# Patient Record
Sex: MALE | Race: OTHER | HISPANIC OR LATINO | URBAN - METROPOLITAN AREA
[De-identification: names, ages, dates, MRNs, and addresses within clinical notes are randomized per-mention and may not be internally consistent; named-entity substitution may affect disease eponyms.]

---

## 2017-10-07 ENCOUNTER — INPATIENT (INPATIENT)
Facility: HOSPITAL | Age: 52
LOS: 2 days | Discharge: HOME | DRG: 872 | End: 2017-10-10
Attending: INTERNAL MEDICINE | Admitting: HOSPITALIST
Payer: MEDICAID

## 2017-10-07 VITALS
HEART RATE: 106 BPM | OXYGEN SATURATION: 100 % | HEIGHT: 69 IN | DIASTOLIC BLOOD PRESSURE: 69 MMHG | SYSTOLIC BLOOD PRESSURE: 113 MMHG | TEMPERATURE: 103 F | RESPIRATION RATE: 16 BRPM | WEIGHT: 240.08 LBS

## 2017-10-07 DIAGNOSIS — Z90.49 ACQUIRED ABSENCE OF OTHER SPECIFIED PARTS OF DIGESTIVE TRACT: Chronic | ICD-10-CM

## 2017-10-07 DIAGNOSIS — A41.9 SEPSIS, UNSPECIFIED ORGANISM: ICD-10-CM

## 2017-10-07 LAB
ANION GAP SERPL CALC-SCNC: 16 MMOL/L — SIGNIFICANT CHANGE UP (ref 5–17)
APPEARANCE UR: CLEAR — SIGNIFICANT CHANGE UP
APTT BLD: 28 SEC — SIGNIFICANT CHANGE UP (ref 27.5–37.4)
BILIRUB UR-MCNC: NEGATIVE — SIGNIFICANT CHANGE UP
BUN SERPL-MCNC: 14 MG/DL — SIGNIFICANT CHANGE UP (ref 8–20)
CALCIUM SERPL-MCNC: 9.1 MG/DL — SIGNIFICANT CHANGE UP (ref 8.6–10.2)
CHLORIDE SERPL-SCNC: 97 MMOL/L — LOW (ref 98–107)
CO2 SERPL-SCNC: 26 MMOL/L — SIGNIFICANT CHANGE UP (ref 22–29)
COLOR SPEC: YELLOW — SIGNIFICANT CHANGE UP
CREAT SERPL-MCNC: 0.81 MG/DL — SIGNIFICANT CHANGE UP (ref 0.5–1.3)
DIFF PNL FLD: ABNORMAL
EPI CELLS # UR: SIGNIFICANT CHANGE UP
GLUCOSE SERPL-MCNC: 115 MG/DL — SIGNIFICANT CHANGE UP (ref 70–115)
GLUCOSE UR QL: NEGATIVE MG/DL — SIGNIFICANT CHANGE UP
HCT VFR BLD CALC: 42.8 % — SIGNIFICANT CHANGE UP (ref 42–52)
HGB BLD-MCNC: 14.8 G/DL — SIGNIFICANT CHANGE UP (ref 14–18)
INR BLD: 1.12 RATIO — SIGNIFICANT CHANGE UP (ref 0.88–1.16)
KETONES UR-MCNC: NEGATIVE — SIGNIFICANT CHANGE UP
LACTATE BLDV-MCNC: 1.7 MMOL/L — SIGNIFICANT CHANGE UP (ref 0.5–2)
LACTATE BLDV-MCNC: 2.2 MMOL/L — HIGH (ref 0.5–2)
LACTATE BLDV-MCNC: 2.3 MMOL/L — HIGH (ref 0.5–2)
LEUKOCYTE ESTERASE UR-ACNC: ABNORMAL
MCHC RBC-ENTMCNC: 30.4 PG — SIGNIFICANT CHANGE UP (ref 27–31)
MCHC RBC-ENTMCNC: 34.6 G/DL — SIGNIFICANT CHANGE UP (ref 32–36)
MCV RBC AUTO: 87.9 FL — SIGNIFICANT CHANGE UP (ref 80–94)
NITRITE UR-MCNC: NEGATIVE — SIGNIFICANT CHANGE UP
PH UR: 8 — SIGNIFICANT CHANGE UP (ref 5–8)
PLATELET # BLD AUTO: 244 K/UL — SIGNIFICANT CHANGE UP (ref 150–400)
POTASSIUM SERPL-MCNC: 3.4 MMOL/L — LOW (ref 3.5–5.3)
POTASSIUM SERPL-SCNC: 3.4 MMOL/L — LOW (ref 3.5–5.3)
PROT UR-MCNC: 15 MG/DL
PROTHROM AB SERPL-ACNC: 12.3 SEC — SIGNIFICANT CHANGE UP (ref 9.8–12.7)
RBC # BLD: 4.87 M/UL — SIGNIFICANT CHANGE UP (ref 4.6–6.2)
RBC # FLD: 13.5 % — SIGNIFICANT CHANGE UP (ref 11–15.6)
RBC CASTS # UR COMP ASSIST: ABNORMAL /HPF (ref 0–4)
SODIUM SERPL-SCNC: 139 MMOL/L — SIGNIFICANT CHANGE UP (ref 135–145)
SP GR SPEC: 1.01 — SIGNIFICANT CHANGE UP (ref 1.01–1.02)
UROBILINOGEN FLD QL: NEGATIVE MG/DL — SIGNIFICANT CHANGE UP
WBC # BLD: 16.6 K/UL — HIGH (ref 4.8–10.8)
WBC # FLD AUTO: 16.6 K/UL — HIGH (ref 4.8–10.8)
WBC UR QL: ABNORMAL

## 2017-10-07 PROCEDURE — 71010: CPT | Mod: 26

## 2017-10-07 PROCEDURE — 99223 1ST HOSP IP/OBS HIGH 75: CPT

## 2017-10-07 PROCEDURE — 99291 CRITICAL CARE FIRST HOUR: CPT

## 2017-10-07 RX ORDER — SODIUM CHLORIDE 9 MG/ML
3 INJECTION INTRAMUSCULAR; INTRAVENOUS; SUBCUTANEOUS ONCE
Qty: 0 | Refills: 0 | Status: COMPLETED | OUTPATIENT
Start: 2017-10-07 | End: 2017-10-07

## 2017-10-07 RX ORDER — CEFTRIAXONE 500 MG/1
1 INJECTION, POWDER, FOR SOLUTION INTRAMUSCULAR; INTRAVENOUS EVERY 24 HOURS
Qty: 0 | Refills: 0 | Status: DISCONTINUED | OUTPATIENT
Start: 2017-10-08 | End: 2017-10-08

## 2017-10-07 RX ORDER — ACETAMINOPHEN 500 MG
650 TABLET ORAL EVERY 6 HOURS
Qty: 0 | Refills: 0 | Status: DISCONTINUED | OUTPATIENT
Start: 2017-10-07 | End: 2017-10-10

## 2017-10-07 RX ORDER — POTASSIUM CHLORIDE 20 MEQ
40 PACKET (EA) ORAL ONCE
Qty: 0 | Refills: 0 | Status: COMPLETED | OUTPATIENT
Start: 2017-10-07 | End: 2017-10-07

## 2017-10-07 RX ORDER — INFLUENZA VIRUS VACCINE 15; 15; 15; 15 UG/.5ML; UG/.5ML; UG/.5ML; UG/.5ML
0.5 SUSPENSION INTRAMUSCULAR ONCE
Qty: 0 | Refills: 0 | Status: COMPLETED | OUTPATIENT
Start: 2017-10-07 | End: 2017-10-10

## 2017-10-07 RX ORDER — CEFTRIAXONE 500 MG/1
1 INJECTION, POWDER, FOR SOLUTION INTRAMUSCULAR; INTRAVENOUS ONCE
Qty: 0 | Refills: 0 | Status: COMPLETED | OUTPATIENT
Start: 2017-10-07 | End: 2017-10-07

## 2017-10-07 RX ORDER — SODIUM CHLORIDE 9 MG/ML
1000 INJECTION INTRAMUSCULAR; INTRAVENOUS; SUBCUTANEOUS
Qty: 0 | Refills: 0 | Status: DISCONTINUED | OUTPATIENT
Start: 2017-10-07 | End: 2017-10-08

## 2017-10-07 RX ORDER — SODIUM CHLORIDE 9 MG/ML
1000 INJECTION INTRAMUSCULAR; INTRAVENOUS; SUBCUTANEOUS ONCE
Qty: 0 | Refills: 0 | Status: COMPLETED | OUTPATIENT
Start: 2017-10-07 | End: 2017-10-07

## 2017-10-07 RX ORDER — ACETAMINOPHEN 500 MG
975 TABLET ORAL ONCE
Qty: 0 | Refills: 0 | Status: COMPLETED | OUTPATIENT
Start: 2017-10-07 | End: 2017-10-07

## 2017-10-07 RX ORDER — SODIUM CHLORIDE 9 MG/ML
3600 INJECTION INTRAMUSCULAR; INTRAVENOUS; SUBCUTANEOUS ONCE
Qty: 0 | Refills: 0 | Status: COMPLETED | OUTPATIENT
Start: 2017-10-07 | End: 2017-10-07

## 2017-10-07 RX ADMIN — SODIUM CHLORIDE 3 MILLILITER(S): 9 INJECTION INTRAMUSCULAR; INTRAVENOUS; SUBCUTANEOUS at 12:08

## 2017-10-07 RX ADMIN — SODIUM CHLORIDE 125 MILLILITER(S): 9 INJECTION INTRAMUSCULAR; INTRAVENOUS; SUBCUTANEOUS at 19:34

## 2017-10-07 RX ADMIN — CEFTRIAXONE 100 GRAM(S): 500 INJECTION, POWDER, FOR SOLUTION INTRAMUSCULAR; INTRAVENOUS at 12:08

## 2017-10-07 RX ADMIN — SODIUM CHLORIDE 3 MILLILITER(S): 9 INJECTION INTRAMUSCULAR; INTRAVENOUS; SUBCUTANEOUS at 14:01

## 2017-10-07 RX ADMIN — Medication 40 MILLIEQUIVALENT(S): at 14:20

## 2017-10-07 RX ADMIN — SODIUM CHLORIDE 3600 MILLILITER(S): 9 INJECTION INTRAMUSCULAR; INTRAVENOUS; SUBCUTANEOUS at 11:45

## 2017-10-07 RX ADMIN — SODIUM CHLORIDE 2000 MILLILITER(S): 9 INJECTION INTRAMUSCULAR; INTRAVENOUS; SUBCUTANEOUS at 14:47

## 2017-10-07 RX ADMIN — SODIUM CHLORIDE 125 MILLILITER(S): 9 INJECTION INTRAMUSCULAR; INTRAVENOUS; SUBCUTANEOUS at 14:47

## 2017-10-07 RX ADMIN — Medication 650 MILLIGRAM(S): at 22:01

## 2017-10-07 RX ADMIN — Medication 975 MILLIGRAM(S): at 12:08

## 2017-10-07 NOTE — H&P ADULT - ASSESSMENT
52M with sepsis and UTI    Sepsis - Intravenous fluid resuscitation. Lactic acid to be repeated. Culture results to be followed.    Urinary tract infection - Empiric antibiotics initiated.    Hypokalemia - Potassium supplemented. Repeat laboratory studies ordered to monitor.    Obstructive sleep apnea - Nocturnal CPAP at the home settings. The patient is to use his own equipment.    Discussed with the patient's daughter at the bedside.

## 2017-10-07 NOTE — ED ADULT NURSE NOTE - ED STAT RN HANDOFF DETAILS
RN aware that pt is to get repeat lactate after IVF bolus and continue documentation on sepsis flowsheet

## 2017-10-07 NOTE — ED ADULT NURSE NOTE - OBJECTIVE STATEMENT
Pt care assumed at 1145, presents to ED awake, alert and oriented x3 c/o bilateral flank pain since yesterday with associated dysuria and burning on urination. Reports hx of prostate "issues" in the past. Denies any abdominal pain, nausea, vomiting, diarrhea or sick contacts. Denies CP or SOB. Code sepsis initiated upon pt's arrival to ED. MD Dick at bedside. IV access established, labs drawn and sent, results pending. IVF infusing, ABX initiated. Pt in no apparent distress at this time, respirations even and unlabored. Cardiac monitor in place showing ST. Will continue to monitor and reassess closely.

## 2017-10-07 NOTE — H&P ADULT - NSHPPHYSICALEXAM_GEN_ALL_CORE
General appearance: NAD, Awake, Alert  HEENT: NCAT, Conjunctiva clear, EOMI, Pupils reactive  Neck: Supple, No JVD, No tenderness  Lungs: Clear to auscultation, Breath sound equal bilaterally, No wheezes, No rales  Cardiovascular: S1S2, Regular rhythm  Abdomen: Soft, Nontender, Nondistended, No guarding/rebound, Positive bowel sounds, No costovertebral tenderness  Extremities: No clubbing, No cyanosis, No edema, No calf tenderness  Neuro: Strength equal bilaterally, No tremors  Psychiatric: Appropriate mood, Normal affect

## 2017-10-07 NOTE — ED PROVIDER NOTE - ENMT, MLM
Airway patent, . Mouth with normal mucosa. Throat has no vesicles, no oropharyngeal exudates and uvula is midline.

## 2017-10-07 NOTE — H&P ADULT - HISTORY OF PRESENT ILLNESS
52M presented with dysuria. The patient had episodes of dysuria starting two days prior to admission. The patient also reports a sensation of loss of balance when he was ambulating. He denied any dizziness or loss of consciousness. The patient was noted to have a urinary tract infection last year requiring admission and treatment with antibiotics. He denied any fever or chills at home. The patient denied any discoloration or blood in the urine but did not a foul odor. He reports a history of an enlarged prostate but is not on any medications. He denied any hesitancy with urination but did report frequency and urgency. He also reported some lower back pain as well.

## 2017-10-07 NOTE — ED PROVIDER NOTE - OBJECTIVE STATEMENT
PT WITH FEVER. HAS BEEN URINATING FREQUENTLY AS PER DAUGHTER. LAST NIGHT HE HAD SEVERE LOW BACK PAIN. TODAY HE SPIKED A TEMPERATURE AND SHE BROUGHT HIM TO ER. PT HAD SIMILAR ILLNESS A FEW YEARS AGO AND WAS HOSPITALIZED FOR IT. ALSO HAS HX PROSTATE ISSUES.  NO OTHER SYMPTOMS. VISION SLIGHTLY BURRY. FEELS HOT. NO V/COUGH/D/RASH  MED HX Enlarged prostate    Fatty liver    Sleep apnea

## 2017-10-07 NOTE — ED ADULT NURSE NOTE - SEPSIS SUSPICION, FT, MLM
Suspicion of infection is present.  Consider obtaining; lactic acid, blood cultures, CBC with diff, basic chemistry, bilirubin.  At the physician's discretion obtain:  UA, chest x-ray, amylase, lipase, ABG, CRP, CT scan.

## 2017-10-07 NOTE — ED ADULT NURSE REASSESSMENT NOTE - NS ED NURSE REASSESS COMMENT FT1
Pt resting comfortably on stretcher, verbalizes improvement in pain/symptoms. Denies any complaints at this time. Pt and family updated on plan of care, pt to be admitted to hospital, pending admission eval and bed assignment. Pt in no apparent distress at this time, respirations even and unlabored. Will continue to monitor and reassess.
Pt transported to [facility] at this time.
Report handed off to Neville KO on 5TWR. Awaiting transport to floor at this time. Pt resting comfortably in no apparent distress.
Dr. Timi Montavlo at bedside for admission eval.

## 2017-10-07 NOTE — ED ADULT TRIAGE NOTE - CHIEF COMPLAINT QUOTE
pt presents to ED with b/l lower back pain x few days, generalized weakness and burning on urination. breathing si even and unlabored. a&ox3. pt febrile in triage.

## 2017-10-07 NOTE — ED PROVIDER NOTE - CRITICAL CARE PROVIDED
interpretation of diagnostic studies/documentation/consult w/ pt's family directly relating to pts condition/35 MINUTES/direct patient care (not related to procedure)

## 2017-10-07 NOTE — ED PROVIDER NOTE - CARE PLAN
Principal Discharge DX:	Sepsis, due to unspecified organism  Secondary Diagnosis:	Prostatitis, unspecified prostatitis type

## 2017-10-08 DIAGNOSIS — A41.9 SEPSIS, UNSPECIFIED ORGANISM: ICD-10-CM

## 2017-10-08 DIAGNOSIS — N30.00 ACUTE CYSTITIS WITHOUT HEMATURIA: ICD-10-CM

## 2017-10-08 DIAGNOSIS — A41.51 SEPSIS DUE TO ESCHERICHIA COLI [E. COLI]: ICD-10-CM

## 2017-10-08 DIAGNOSIS — N39.0 URINARY TRACT INFECTION, SITE NOT SPECIFIED: ICD-10-CM

## 2017-10-08 DIAGNOSIS — N40.0 BENIGN PROSTATIC HYPERPLASIA WITHOUT LOWER URINARY TRACT SYMPTOMS: ICD-10-CM

## 2017-10-08 DIAGNOSIS — G47.30 SLEEP APNEA, UNSPECIFIED: ICD-10-CM

## 2017-10-08 LAB
ANION GAP SERPL CALC-SCNC: 14 MMOL/L — SIGNIFICANT CHANGE UP (ref 5–17)
BUN SERPL-MCNC: 11 MG/DL — SIGNIFICANT CHANGE UP (ref 8–20)
CALCIUM SERPL-MCNC: 8 MG/DL — LOW (ref 8.6–10.2)
CHLORIDE SERPL-SCNC: 106 MMOL/L — SIGNIFICANT CHANGE UP (ref 98–107)
CO2 SERPL-SCNC: 22 MMOL/L — SIGNIFICANT CHANGE UP (ref 22–29)
CREAT SERPL-MCNC: 0.7 MG/DL — SIGNIFICANT CHANGE UP (ref 0.5–1.3)
E COLI DNA BLD POS QL NAA+NON-PROBE: SIGNIFICANT CHANGE UP
GLUCOSE SERPL-MCNC: 122 MG/DL — HIGH (ref 70–115)
HCT VFR BLD CALC: 40.2 % — LOW (ref 42–52)
HGB BLD-MCNC: 13.6 G/DL — LOW (ref 14–18)
MAGNESIUM SERPL-MCNC: 1.6 MG/DL — SIGNIFICANT CHANGE UP (ref 1.6–2.6)
MCHC RBC-ENTMCNC: 30.3 PG — SIGNIFICANT CHANGE UP (ref 27–31)
MCHC RBC-ENTMCNC: 33.8 G/DL — SIGNIFICANT CHANGE UP (ref 32–36)
MCV RBC AUTO: 89.5 FL — SIGNIFICANT CHANGE UP (ref 80–94)
METHOD TYPE: SIGNIFICANT CHANGE UP
PLATELET # BLD AUTO: 187 K/UL — SIGNIFICANT CHANGE UP (ref 150–400)
POTASSIUM SERPL-MCNC: 3.4 MMOL/L — LOW (ref 3.5–5.3)
POTASSIUM SERPL-SCNC: 3.4 MMOL/L — LOW (ref 3.5–5.3)
RBC # BLD: 4.49 M/UL — LOW (ref 4.6–6.2)
RBC # FLD: 13.9 % — SIGNIFICANT CHANGE UP (ref 11–15.6)
SODIUM SERPL-SCNC: 142 MMOL/L — SIGNIFICANT CHANGE UP (ref 135–145)
WBC # BLD: 21.8 K/UL — HIGH (ref 4.8–10.8)
WBC # FLD AUTO: 21.8 K/UL — HIGH (ref 4.8–10.8)

## 2017-10-08 PROCEDURE — 99233 SBSQ HOSP IP/OBS HIGH 50: CPT

## 2017-10-08 PROCEDURE — 99223 1ST HOSP IP/OBS HIGH 75: CPT

## 2017-10-08 RX ORDER — ERTAPENEM SODIUM 1 G/1
1000 INJECTION, POWDER, LYOPHILIZED, FOR SOLUTION INTRAMUSCULAR; INTRAVENOUS EVERY 24 HOURS
Qty: 0 | Refills: 0 | Status: DISCONTINUED | OUTPATIENT
Start: 2017-10-09 | End: 2017-10-09

## 2017-10-08 RX ORDER — POTASSIUM CHLORIDE 20 MEQ
40 PACKET (EA) ORAL EVERY 4 HOURS
Qty: 0 | Refills: 0 | Status: COMPLETED | OUTPATIENT
Start: 2017-10-08 | End: 2017-10-08

## 2017-10-08 RX ORDER — ONDANSETRON 8 MG/1
4 TABLET, FILM COATED ORAL ONCE
Qty: 0 | Refills: 0 | Status: COMPLETED | OUTPATIENT
Start: 2017-10-08 | End: 2017-10-08

## 2017-10-08 RX ORDER — ERTAPENEM SODIUM 1 G/1
INJECTION, POWDER, LYOPHILIZED, FOR SOLUTION INTRAMUSCULAR; INTRAVENOUS
Qty: 0 | Refills: 0 | Status: DISCONTINUED | OUTPATIENT
Start: 2017-10-08 | End: 2017-10-09

## 2017-10-08 RX ORDER — ERTAPENEM SODIUM 1 G/1
1000 INJECTION, POWDER, LYOPHILIZED, FOR SOLUTION INTRAMUSCULAR; INTRAVENOUS ONCE
Qty: 0 | Refills: 0 | Status: COMPLETED | OUTPATIENT
Start: 2017-10-08 | End: 2017-10-08

## 2017-10-08 RX ADMIN — SODIUM CHLORIDE 125 MILLILITER(S): 9 INJECTION INTRAMUSCULAR; INTRAVENOUS; SUBCUTANEOUS at 05:56

## 2017-10-08 RX ADMIN — Medication 40 MILLIEQUIVALENT(S): at 22:28

## 2017-10-08 RX ADMIN — ONDANSETRON 4 MILLIGRAM(S): 8 TABLET, FILM COATED ORAL at 20:18

## 2017-10-08 RX ADMIN — Medication 40 MILLIEQUIVALENT(S): at 17:12

## 2017-10-08 RX ADMIN — ERTAPENEM SODIUM 120 MILLIGRAM(S): 1 INJECTION, POWDER, LYOPHILIZED, FOR SOLUTION INTRAMUSCULAR; INTRAVENOUS at 18:39

## 2017-10-08 RX ADMIN — CEFTRIAXONE 100 GRAM(S): 500 INJECTION, POWDER, FOR SOLUTION INTRAMUSCULAR; INTRAVENOUS at 14:31

## 2017-10-08 RX ADMIN — Medication 650 MILLIGRAM(S): at 20:00

## 2017-10-08 NOTE — PROGRESS NOTE ADULT - ASSESSMENT
52 year old Male presented with dysuria. The patient had episodes of dysuria starting two days prior to admission. The patient also reports a sensation of loss of balance when he was ambulating. He denied any dizziness or loss of consciousness. The patient was noted to have a urinary tract infection last year requiring admission and treatment with antibiotics. He denied any fever or chills at home. The patient denied any discoloration or blood in the urine but did not a foul odor. He reports a history of an enlarged prostate but is not on any medications. He denied any hesitancy with urination but did report frequency and urgency. He also reported some lower back pain as well. 52 year old Male presented with dysuria. The patient had episodes of dysuria starting two days prior to admission. The patient also reports a sensation of loss of balance when he was ambulating. He denied any dizziness or loss of consciousness. The patient was noted to have a urinary tract infection last year requiring admission and treatment with antibiotics. He denied any fever or chills at home. The patient denied any discoloration or blood in the urine but did not a foul odor. He reports a history of an enlarged prostate but is not on any medications. He denied any hesitancy with urination but did report frequency and urgency. He also reported some lower back pain as well. 10/8 patient has positive blood cultures. ID consulted as well as pulm for bipap for sleep apnea.

## 2017-10-08 NOTE — CONSULT NOTE ADULT - SUBJECTIVE AND OBJECTIVE BOX
PULMONARY CONSULT NOTE      GABE ALFARODAYDAY-[id number]    Patient is a 52y old  Male who presents with a chief complaint of Fever (07 Oct 2017 14:43)   Found to have UTI.  Pt has underlying BRIDGETTE and uses full face CPAP every night.  Does not have his own machine   Patient has seen Dr. Mcknight in [past      INTERVAL HPI/OVERNIGHT EVENTS:    MEDICATIONS  (STANDING):  cefTRIAXone   IVPB 1 Gram(s) IV Intermittent every 24 hours  influenza   Vaccine 0.5 milliLiter(s) IntraMuscular once      MEDICATIONS  (PRN):  acetaminophen   Tablet 650 milliGRAM(s) Oral every 6 hours PRN For Temp greater than 38 C (100.4 F)      Allergies    No Known Allergies    Intolerances        PAST MEDICAL & SURGICAL HISTORY:  Fatty liver  Sleep apnea  Enlarged prostate  S/P appendectomy      FAMILY HISTORY:  No pertinent family history in first degree relatives      SOCIAL HISTORY  Smoking History:     REVIEW OF SYSTEMS:    CONSTITUTIONAL:  As per HPI.    HEENT:  Eyes:  No diplopia or blurred vision. ENT:  No earache, sore throat or runny nose.    CARDIOVASCULAR:  No pressure, squeezing, tightness, heaviness or aching about the chest; no palpitations.    RESPIRATORY:  No cough, shortness of breath, PND or orthopnea. Mild SOBOE    GASTROINTESTINAL:  No nausea, vomiting or diarrhea.    GENITOURINARY:  No dysuria, frequency or urgency.    MUSCULOSKELETAL:  No joint pains    SKIN:  No new lesions.    NEUROLOGIC:  No paresthesias, fasciculations, seizures or weakness.    PSYCHIATRIC:  No disorder of thought or mood.    ENDOCRINE:  No heat or cold intolerance, polyuria or polydipsia.    HEMATOLOGICAL:  No easy bruising or bleeding.     Vital Signs Last 24 Hrs  T(C): 37 (08 Oct 2017 10:56), Max: 38.8 (07 Oct 2017 21:58)  T(F): 98.6 (08 Oct 2017 10:56), Max: 101.8 (07 Oct 2017 21:58)  HR: 83 (08 Oct 2017 10:56) (76 - 99)  BP: 140/82 (08 Oct 2017 10:56) (109/59 - 140/82)  BP(mean): --  RR: 18 (08 Oct 2017 10:56) (18 - 20)  SpO2: 96% (07 Oct 2017 14:17) (96% - 96%)    PHYSICAL EXAMINATION:    GENERAL: The patient is a well-developed, well-nourished ___male__in no apparent distress.     HEENT: Head is normocephalic and atraumatic. Extraocular muscles are intact. Mucous membranes are moist.     NECK: Supple.     LUNGS: Clear to auscultation without wheezing, rales, or rhonchi. Respirations unlabored    HEART: Regular rate and rhythm without murmur.    ABDOMEN: Soft, nontender, and nondistended.  No hepatosplenomegaly is noted.    EXTREMITIES: Without any cyanosis, clubbing, rash, lesions or edema.    NEUROLOGIC: Grossly intact.    SKIN: No ulceration or induration present.      LABS:                        13.6   21.8  )-----------( 187      ( 08 Oct 2017 06:57 )             40.2     10-    142  |  106  |  11.0  ----------------------------<  122<H>  3.4<L>   |  22.0  |  0.70    Ca    8.0<L>      08 Oct 2017 06:57  Mg     1.6     10-08      PT/INR - ( 07 Oct 2017 12:06 )   PT: 12.3 sec;   INR: 1.12 ratio         PTT - ( 07 Oct 2017 12:06 )  PTT:28.0 sec  Urinalysis Basic - ( 07 Oct 2017 12:30 )    Color: Yellow / Appearance: Clear / S.015 / pH: x  Gluc: x / Ketone: Negative  / Bili: Negative / Urobili: Negative mg/dL   Blood: x / Protein: 15 mg/dL / Nitrite: Negative   Leuk Esterase: Moderate / RBC: 3-5 /HPF / WBC 6-10   Sq Epi: x / Non Sq Epi: Occasional / Bacteria: x                      MICROBIOLOGY:    RADIOLOGY & ADDITIONAL STUDIES:< from: Xray Chest 1 View AP/PA. (10.07.17 @ 12:21) >  Impression: Clear lungs.    < end of copied text >

## 2017-10-08 NOTE — CONSULT NOTE ADULT - ASSESSMENT
53 yo male with BRIDGETTE.   Uses full face mask every night.  Does not know settings.   Will arrange for CPAP tonight because patient does not have his own machine.

## 2017-10-08 NOTE — CONSULT NOTE ADULT - SUBJECTIVE AND OBJECTIVE BOX
NPP INFECTIOUS DISEASES AND INTERNAL MEDICINE [facility]  =======================================================  Jermain Mcculre MD [facility]   Holden Cadet MD  Diplomates American Board of Internal Medicine and Infectious Diseases  =======================================================  [facility]-[id number]  GABE ALFARO is a 52y  Male     This 52 y.o. man with enlarged prostate, here for back pain and fevers. He started feeling back pain about 2 days prior to admission.  Back pain progressed and he had to take a nap after getting back from work on Friday 10/6/17.  he slept and around midnight on day of admission he also had pain in the right groin, and burning on urination.     His urine has been foul smelling for several months. no gas in urine stream and no feces in urine stream.  no blood in urine.     he is noted to have fevers of 101.8./  He was started empirically on Ceftriaxone   Blood culture PCR positive for E coli. Urine cx positive for GNR  =======================================================  Past Medical & Surgical Hx:  =====================  PAST MEDICAL & SURGICAL HISTORY:  Fatty liver  Sleep apnea  Enlarged prostate  S/P appendectomy       Problem List:  ==========  HEALTH ISSUES - PROBLEM Dx:  Acute cystitis without hematuria: Acute cystitis without hematuria  Sepsis, due to unspecified organism: Sepsis, due to unspecified organism  Sleep apnea, unspecified type: Sleep apnea, unspecified type    Social Hx:  =======  no toxic habits currently    FAMILY HISTORY:  No pertinent family history in first degree relatives    Allergies    No Known Allergies    Intolerances        MEDICATIONS  (STANDING):  ertapenem  IVPB      influenza   Vaccine 0.5 milliLiter(s) IntraMuscular once  potassium chloride    Tablet ER 40 milliEquivalent(s) Oral every 4 hours    MEDICATIONS  (PRN):  acetaminophen   Tablet 650 milliGRAM(s) Oral every 6 hours PRN For Temp greater than 38 C (100.4 F)        =======================================================  REVIEW OF SYSTEMS:  Constitutional: No fever, No chills, No sweats.  Eye: No icterus, No double vision.  Ear/Nose/Mouth/Throat: No nasal congestion, No sore throat.  Respiratory: No shortness of breath, No cough, No sputum production, No wheezing.  Cardiovascular: No chest pain, No palpitations, No syncope.  Gastrointestinal: No nausea, No vomiting, No diarrhea, No abdominal pain.  Genitourinary: POSITIVE dysuria.  No hematuria, No change in urine stream.  Hematology/Lymphatics: No bleeding tendency.  Endocrine: No excessive thirst, No polyuria.  Immunologic: No malaise.  Musculoskeletal: POSITIVE back pain, No neck pain, No joint pain, No muscle pain.  Integumentary: No rash, No pruritus, No skin lesion.  Neurologic: No numbness, No tingling, No headache.  Psychiatric: No depression.    =======================================================      07 Oct 2017 07:01  -  08 Oct 2017 07:00  --------------------------------------------------------  IN:    Oral Fluid: 360 mL    sodium chloride 0.9%: 2000 mL    Sodium Chloride 0.9% IV Bolus: 3600 mL  Total IN: 5960 mL    OUT:    Voided: 1800 mL  Total OUT: 1800 mL    Total NET: 4160 mL      Physical Exam:  ============  Vital Signs Last 24 Hrs  T(C): 37 (08 Oct 2017 10:56), Max: 38.8 (07 Oct 2017 21:58)  T(F): 98.6 (08 Oct 2017 10:56), Max: 101.8 (07 Oct 2017 21:58)  HR: 83 (08 Oct 2017 10:56) (76 - 98)  BP: 140/82 (08 Oct 2017 10:56) (109/59 - 140/82)  RR: 18 (08 Oct 2017 10:56) (18 - 20)    General: Alert and oriented, No acute distress. obese  Eye: Pupils are equal, round and reactive to light, Extraocular movements are intact, Normal conjunctiva.  HENT: Normocephalic, Oral mucosa is moist, No pharyngeal erythema, No sinus tenderness.  Neck: Supple, No lymphadenopathy.  Respiratory: Lungs are clear to auscultation, Respirations are non-labored.  Cardiovascular: Normal rate, Regular rhythm, No murmur, Good pulses equal in all extremities, No edema.  Gastrointestinal: Soft, Non-tender, Non-distended, Normal bowel sounds.  Genitourinary: No costovertebral angle tenderness.  TENDERNESS ON PALPATION OF SUPRAPUBIC REGION  Lymphatics: No lymphadenopathy neck, axilla, groin.  Musculoskeletal: Normal range of motion, Normal strength.  Integumentary: No rash.  Neurologic: Alert, Oriented, No focal deficits, Cranial Nerves II-XII are grossly intact.  Psychiatric: Appropriate mood & affect.      =======================================================  Labs:  ====    Labs:  10-08    142  |  106  |  11.0  ----------------------------<  122<H>  3.4<L>   |  22.0  |  0.70    Ca    8.0<L>      08 Oct 2017 06:57  Mg     1.6     10-08                            13.6   21.8  )-----------( 187      ( 08 Oct 2017 06:57 )             40.2       PT/INR - ( 07 Oct 2017 12:06 )   PT: 12.3 sec;   INR: 1.12 ratio         PTT - ( 07 Oct 2017 12:06 )  PTT:28.0 sec  Urinalysis Basic - ( 07 Oct 2017 12:30 )    Color: Yellow / Appearance: Clear / S.015 / pH: x  Gluc: x / Ketone: Negative  / Bili: Negative / Urobili: Negative mg/dL   Blood: x / Protein: 15 mg/dL / Nitrite: Negative   Leuk Esterase: Moderate / RBC: 3-5 /HPF / WBC 6-10   Sq Epi: x / Non Sq Epi: Occasional / Bacteria: x        RECENT CULTURES:  10-07 @ 12:40 .Blood Blood     Growth in anaerobic bottle: Gram Negative Rods  Anaerobic Bottle: 12:13 Hours to positivity  Aerobic Bottle: No growth to date  .  TYPE: (C=Critical, N=Notification, A=Abnormal) C  TESTS:  _ Positive Blood GS  DATE/TIME CALLED: _ 10/08/2017 11:10  CALLED TO: _ 5TWR: Yo Rodriguez RN  READ BACK (2 Patient Identifiers)(Y/N): _ Y  READ BACK VALUES (Y/N): _ Y  CALLED BY: Rakan contreras        10-07 @ 12:30 .Urine Clean Catch (Midstream)     >100,000 CFU/ml Gram Negative Rods Identification and susceptibility to  follow.        10-07 @ 12:08 .Blood Blood Blood Culture PCR    Growth in aerobic and anaerobic bottles: Gram Negative Rods  ***Blood Panel PCR results on this specimen are available  approximately 3 hours after the Gram stain result.***  Gram stain, PCR, and/or culture results may not always  correspond due todifference in methodologies.  ************************************************************  This PCR assay was performed using [employer].  The following targets are tested for: Enterococcus,  vancomycin resistant enterococci, Listeria monocytogenes,  coagulase negative staphylococci, S. aureus,  methicillin resistant S. aureus, Streptococcus agalactiae  (Group B), S. pneumoniae, S. pyogenes (Group A),  Acinetobacter baumannii, Enterobacter cloacae, E. coli,  Klebsiella oxytoca, K. pneumoniae, Proteus sp.,  Serratia marcescens, Haemophilus influenzae,  Neisseria meningitidis, Pseudomonas aeruginosa, Candida  albicans, C. glabrata, C krusei, C parapsilosis,  C. tropicalis and the KPC resistance gene.  .  Anaerobic Bottle: 11:33 Hours topositivity  Aerobic Bottle: 14:44 Hours to positivity  .  TYPE: (C=Critical, N=Notification, A=Abnormal) C  TESTS:  _ Positive Blood GS  DATE/TIME CALLED: _ 10/08/2017 11:10  CALLED TO: _ 5TWR: Yo Rodriguez RN  READ BACK (2 Patient Identifiers)(Y/N): _ Y  READ BACK VALUES (Y/N): _ Y  CALLED BY: Rakan contreras

## 2017-10-09 LAB
-  AMIKACIN: SIGNIFICANT CHANGE UP
-  AMPICILLIN/SULBACTAM: SIGNIFICANT CHANGE UP
-  AMPICILLIN: SIGNIFICANT CHANGE UP
-  AZTREONAM: SIGNIFICANT CHANGE UP
-  CEFAZOLIN: SIGNIFICANT CHANGE UP
-  CEFEPIME: SIGNIFICANT CHANGE UP
-  CEFOXITIN: SIGNIFICANT CHANGE UP
-  CEFTAZIDIME: SIGNIFICANT CHANGE UP
-  CEFTRIAXONE: SIGNIFICANT CHANGE UP
-  CIPROFLOXACIN: SIGNIFICANT CHANGE UP
-  ERTAPENEM: SIGNIFICANT CHANGE UP
-  GENTAMICIN: SIGNIFICANT CHANGE UP
-  IMIPENEM: SIGNIFICANT CHANGE UP
-  LEVOFLOXACIN: SIGNIFICANT CHANGE UP
-  MEROPENEM: SIGNIFICANT CHANGE UP
-  NITROFURANTOIN: SIGNIFICANT CHANGE UP
-  PIPERACILLIN/TAZOBACTAM: SIGNIFICANT CHANGE UP
-  TOBRAMYCIN: SIGNIFICANT CHANGE UP
-  TRIMETHOPRIM/SULFAMETHOXAZOLE: SIGNIFICANT CHANGE UP
ANION GAP SERPL CALC-SCNC: 13 MMOL/L — SIGNIFICANT CHANGE UP (ref 5–17)
BUN SERPL-MCNC: 11 MG/DL — SIGNIFICANT CHANGE UP (ref 8–20)
CALCIUM SERPL-MCNC: 8.9 MG/DL — SIGNIFICANT CHANGE UP (ref 8.6–10.2)
CHLORIDE SERPL-SCNC: 103 MMOL/L — SIGNIFICANT CHANGE UP (ref 98–107)
CO2 SERPL-SCNC: 24 MMOL/L — SIGNIFICANT CHANGE UP (ref 22–29)
CREAT SERPL-MCNC: 0.76 MG/DL — SIGNIFICANT CHANGE UP (ref 0.5–1.3)
CULTURE RESULTS: SIGNIFICANT CHANGE UP
CULTURE RESULTS: SIGNIFICANT CHANGE UP
GLUCOSE SERPL-MCNC: 145 MG/DL — HIGH (ref 70–115)
HCT VFR BLD CALC: 40.8 % — LOW (ref 42–52)
HGB BLD-MCNC: 13.8 G/DL — LOW (ref 14–18)
MAGNESIUM SERPL-MCNC: 1.8 MG/DL — SIGNIFICANT CHANGE UP (ref 1.6–2.6)
MCHC RBC-ENTMCNC: 29.9 PG — SIGNIFICANT CHANGE UP (ref 27–31)
MCHC RBC-ENTMCNC: 33.8 G/DL — SIGNIFICANT CHANGE UP (ref 32–36)
MCV RBC AUTO: 88.5 FL — SIGNIFICANT CHANGE UP (ref 80–94)
METHOD TYPE: SIGNIFICANT CHANGE UP
ORGANISM # SPEC MICROSCOPIC CNT: SIGNIFICANT CHANGE UP
PHOSPHATE SERPL-MCNC: 2.1 MG/DL — LOW (ref 2.4–4.7)
PLATELET # BLD AUTO: 186 K/UL — SIGNIFICANT CHANGE UP (ref 150–400)
POTASSIUM SERPL-MCNC: 3.8 MMOL/L — SIGNIFICANT CHANGE UP (ref 3.5–5.3)
POTASSIUM SERPL-SCNC: 3.8 MMOL/L — SIGNIFICANT CHANGE UP (ref 3.5–5.3)
RBC # BLD: 4.61 M/UL — SIGNIFICANT CHANGE UP (ref 4.6–6.2)
RBC # FLD: 13.6 % — SIGNIFICANT CHANGE UP (ref 11–15.6)
SODIUM SERPL-SCNC: 140 MMOL/L — SIGNIFICANT CHANGE UP (ref 135–145)
SPECIMEN SOURCE: SIGNIFICANT CHANGE UP
SPECIMEN SOURCE: SIGNIFICANT CHANGE UP
WBC # BLD: 9.5 K/UL — SIGNIFICANT CHANGE UP (ref 4.8–10.8)
WBC # FLD AUTO: 9.5 K/UL — SIGNIFICANT CHANGE UP (ref 4.8–10.8)

## 2017-10-09 PROCEDURE — 99233 SBSQ HOSP IP/OBS HIGH 50: CPT

## 2017-10-09 PROCEDURE — 99232 SBSQ HOSP IP/OBS MODERATE 35: CPT

## 2017-10-09 RX ORDER — TAMSULOSIN HYDROCHLORIDE 0.4 MG/1
0.4 CAPSULE ORAL AT BEDTIME
Qty: 0 | Refills: 0 | Status: DISCONTINUED | OUTPATIENT
Start: 2017-10-09 | End: 2017-10-10

## 2017-10-09 RX ORDER — SACCHAROMYCES BOULARDII 250 MG
500 POWDER IN PACKET (EA) ORAL
Qty: 0 | Refills: 0 | Status: DISCONTINUED | OUTPATIENT
Start: 2017-10-09 | End: 2017-10-10

## 2017-10-09 RX ORDER — POTASSIUM CHLORIDE 20 MEQ
40 PACKET (EA) ORAL EVERY 4 HOURS
Qty: 0 | Refills: 0 | Status: COMPLETED | OUTPATIENT
Start: 2017-10-09 | End: 2017-10-09

## 2017-10-09 RX ADMIN — Medication 500 MILLIGRAM(S): at 17:27

## 2017-10-09 RX ADMIN — TAMSULOSIN HYDROCHLORIDE 0.4 MILLIGRAM(S): 0.4 CAPSULE ORAL at 22:08

## 2017-10-09 RX ADMIN — Medication 650 MILLIGRAM(S): at 08:32

## 2017-10-09 RX ADMIN — Medication 40 MILLIEQUIVALENT(S): at 14:20

## 2017-10-09 RX ADMIN — Medication 650 MILLIGRAM(S): at 17:27

## 2017-10-09 RX ADMIN — Medication 40 MILLIEQUIVALENT(S): at 10:38

## 2017-10-09 NOTE — CONSULT NOTE ADULT - SUBJECTIVE AND OBJECTIVE BOX
HPI:  52M presented with dysuria. The patient had episodes of dysuria starting two days prior to admission. The patient also reports a sensation of loss of balance when he was ambulating. He denied any dizziness or loss of consciousness. The patient was noted to have a urinary tract infection last year requiring admission and treatment with antibiotics. He denied any fever or chills at home. The patient denied any discoloration or blood in the urine but did not a foul odor. He reports a history of an enlarged prostate but is not on any medications. He denied any hesitancy with urination but did report frequency and urgency. He also reported some lower back pain as well. (07 Oct 2017 14:43)    Lower back pain and dysuria.  + subjective fevers, chills.  Feels slightly better.  No hematuria, no hesitancy but he does not feel empty after voiding.            PAST MEDICAL & SURGICAL HISTORY:  Fatty liver  Sleep apnea  Enlarged prostate  S/P appendectomy      REVIEW OF SYSTEMS:    Reviewed h and [ ros    MEDICATIONS  (STANDING):  influenza   Vaccine 0.5 milliLiter(s) IntraMuscular once  levoFLOXacin IVPB 750 milliGRAM(s) IV Intermittent every 24 hours  saccharomyces boulardii 500 milliGRAM(s) Oral two times a day    MEDICATIONS  (PRN):  acetaminophen   Tablet 650 milliGRAM(s) Oral every 6 hours PRN For Temp greater than 38 C (100.4 F)      Allergies    No Known Allergies    Intolerances        SOCIAL HISTORY:    FAMILY HISTORY:  No pertinent family history in first degree relatives      Vital Signs Last 24 Hrs  T(C): 38.9 (09 Oct 2017 10:52), Max: 38.9 (09 Oct 2017 10:52)  T(F): 102.1 (09 Oct 2017 10:52), Max: 102.1 (09 Oct 2017 10:52)  HR: 81 (09 Oct 2017 10:52) (81 - 92)  BP: 128/77 (09 Oct 2017 10:52) (121/67 - 131/68)  BP(mean): --  RR: 18 (09 Oct 2017 10:52) (18 - 20)  SpO2: 94% (09 Oct 2017 10:52) (94% - 97%)    PHYSICAL EXAM:    abd- soft, nd, bs+, no masses    Mild lower abd tenderness    Testicles and cords palpably normal except for a right epididymal cyst    LABS:                        13.8   9.5   )-----------( 186      ( 09 Oct 2017 09:15 )             40.8     10-09    140  |  103  |  11.0  ----------------------------<  145<H>  3.8   |  24.0  |  0.76    Ca    8.9      09 Oct 2017 09:15  Phos  2.1     10-09  Mg     1.8     10-09      UC and BC with Gram negative bacilli      RADIOLOGY & ADDITIONAL STUDIES:

## 2017-10-09 NOTE — PROGRESS NOTE ADULT - ASSESSMENT
52 year old Male presented with dysuria. The patient had episodes of dysuria starting two days prior to admission. The patient also reports a sensation of loss of balance when he was ambulating. He denied any dizziness or loss of consciousness. The patient was noted to have a urinary tract infection last year requiring admission and treatment with antibiotics. He denied any fever or chills at home. The patient denied any discoloration or blood in the urine but did not a foul odor. He reports a history of an enlarged prostate but is not on any medications. He denied any hesitancy with urination but did report frequency and urgency. He also reported some lower back pain as well. 10/8 patient has positive blood cultures. ID consulted as well as pulm for bipap for sleep apnea.

## 2017-10-10 ENCOUNTER — TRANSCRIPTION ENCOUNTER (OUTPATIENT)
Age: 52
End: 2017-10-10

## 2017-10-10 VITALS
RESPIRATION RATE: 20 BRPM | SYSTOLIC BLOOD PRESSURE: 117 MMHG | TEMPERATURE: 99 F | DIASTOLIC BLOOD PRESSURE: 71 MMHG | HEART RATE: 78 BPM

## 2017-10-10 LAB
ANION GAP SERPL CALC-SCNC: 12 MMOL/L — SIGNIFICANT CHANGE UP (ref 5–17)
BUN SERPL-MCNC: 13 MG/DL — SIGNIFICANT CHANGE UP (ref 8–20)
CALCIUM SERPL-MCNC: 8.9 MG/DL — SIGNIFICANT CHANGE UP (ref 8.6–10.2)
CHLORIDE SERPL-SCNC: 103 MMOL/L — SIGNIFICANT CHANGE UP (ref 98–107)
CO2 SERPL-SCNC: 25 MMOL/L — SIGNIFICANT CHANGE UP (ref 22–29)
CREAT SERPL-MCNC: 0.8 MG/DL — SIGNIFICANT CHANGE UP (ref 0.5–1.3)
GLUCOSE SERPL-MCNC: 132 MG/DL — HIGH (ref 70–115)
POTASSIUM SERPL-MCNC: 4 MMOL/L — SIGNIFICANT CHANGE UP (ref 3.5–5.3)
POTASSIUM SERPL-SCNC: 4 MMOL/L — SIGNIFICANT CHANGE UP (ref 3.5–5.3)
SODIUM SERPL-SCNC: 140 MMOL/L — SIGNIFICANT CHANGE UP (ref 135–145)

## 2017-10-10 PROCEDURE — 85730 THROMBOPLASTIN TIME PARTIAL: CPT

## 2017-10-10 PROCEDURE — 83735 ASSAY OF MAGNESIUM: CPT

## 2017-10-10 PROCEDURE — 71045 X-RAY EXAM CHEST 1 VIEW: CPT

## 2017-10-10 PROCEDURE — 90686 IIV4 VACC NO PRSV 0.5 ML IM: CPT

## 2017-10-10 PROCEDURE — 87150 DNA/RNA AMPLIFIED PROBE: CPT

## 2017-10-10 PROCEDURE — 83605 ASSAY OF LACTIC ACID: CPT

## 2017-10-10 PROCEDURE — 87186 SC STD MICRODIL/AGAR DIL: CPT

## 2017-10-10 PROCEDURE — 84100 ASSAY OF PHOSPHORUS: CPT

## 2017-10-10 PROCEDURE — 87086 URINE CULTURE/COLONY COUNT: CPT

## 2017-10-10 PROCEDURE — 85027 COMPLETE CBC AUTOMATED: CPT

## 2017-10-10 PROCEDURE — 36415 COLL VENOUS BLD VENIPUNCTURE: CPT

## 2017-10-10 PROCEDURE — 99285 EMERGENCY DEPT VISIT HI MDM: CPT | Mod: 25

## 2017-10-10 PROCEDURE — 80048 BASIC METABOLIC PNL TOTAL CA: CPT

## 2017-10-10 PROCEDURE — 85610 PROTHROMBIN TIME: CPT

## 2017-10-10 PROCEDURE — 94760 N-INVAS EAR/PLS OXIMETRY 1: CPT

## 2017-10-10 PROCEDURE — 99221 1ST HOSP IP/OBS SF/LOW 40: CPT

## 2017-10-10 PROCEDURE — 81001 URINALYSIS AUTO W/SCOPE: CPT

## 2017-10-10 PROCEDURE — 96374 THER/PROPH/DIAG INJ IV PUSH: CPT

## 2017-10-10 PROCEDURE — 94660 CPAP INITIATION&MGMT: CPT

## 2017-10-10 PROCEDURE — 99239 HOSP IP/OBS DSCHRG MGMT >30: CPT

## 2017-10-10 PROCEDURE — 87040 BLOOD CULTURE FOR BACTERIA: CPT

## 2017-10-10 RX ORDER — TAMSULOSIN HYDROCHLORIDE 0.4 MG/1
1 CAPSULE ORAL
Qty: 30 | Refills: 0 | OUTPATIENT
Start: 2017-10-10 | End: 2017-11-09

## 2017-10-10 RX ORDER — SACCHAROMYCES BOULARDII 250 MG
2 POWDER IN PACKET (EA) ORAL
Qty: 40 | Refills: 0 | OUTPATIENT
Start: 2017-10-10 | End: 2017-10-20

## 2017-10-10 RX ADMIN — INFLUENZA VIRUS VACCINE 0.5 MILLILITER(S): 15; 15; 15; 15 SUSPENSION INTRAMUSCULAR at 12:40

## 2017-10-10 RX ADMIN — Medication 500 MILLIGRAM(S): at 17:42

## 2017-10-10 RX ADMIN — Medication 500 MILLIGRAM(S): at 06:20

## 2017-10-10 NOTE — DISCHARGE NOTE ADULT - MEDICATION SUMMARY - MEDICATIONS TO TAKE
I will START or STAY ON the medications listed below when I get home from the hospital:    tamsulosin 0.4 mg oral capsule  -- 1 cap(s) by mouth once a day (at bedtime)  -- Indication: For BPH    saccharomyces boulardii lyo 250 mg oral capsule  -- 2 cap(s) by mouth 2 times a day  -- Indication: For Supplement    Levaquin 750 mg oral tablet  -- 1 tab(s) by mouth every 24 hours  -- Indication: For Acute cystitis without hematuria

## 2017-10-10 NOTE — DISCHARGE NOTE ADULT - HOSPITAL COURSE
52 year old Male presented with dysuria. The patient had episodes of dysuria starting two days prior to admission. The patient also reports a sensation of loss of balance when he was ambulating. He denied any dizziness or loss of consciousness. The patient was noted to have a urinary tract infection last year requiring admission and treatment with antibiotics. He denied any fever or chills at home. The patient denied any discoloration or blood in the urine but did not a foul odor. He reports a history of an enlarged prostate but is not on any medications. He denied any hesitancy with urination but did report frequency and urgency. He also reported some lower back pain as well. 10/8 patient has positive blood cultures. ID consulted as well as pulm for bipap for sleep apnea.  10/10 Patient has been sen by ID and urology and is cleared for discharge with outpatient follow up.     Problem/Plan - 1:  ·  Problem: Sepsis, due to unspecified organism.  Plan: Positive blood cultures, on rocephin. Awaiting sensitivity.  Consult ID.  ID input appreciated, may switch to oral abx in am.  patient prescribed a prolonged course of levofloxacin.      Problem/Plan - 2:  ·  Problem: Acute cystitis without hematuria.  Plan: Continue rocephin, ID recommends levofloxacin 10 days as outpatient.     Problem/Plan - 3:  ·  Problem: Sleep apnea, unspecified type.  Plan: Pulm input appreciated, bipap ordered.     Attending Attestation:   [facility] with outpatient follow up.     I was physically present for the key portions of the evaluation and management (E/M) service provided.  I agree with the above history, physical, and plan which I have reviewed and edited where appropriate.     45 minutes spent on total encounter; more than 50% of the visit was spent counseling and/or coordinating care by the attending physician.

## 2017-10-10 NOTE — DISCHARGE NOTE ADULT - CARE PLAN
Principal Discharge DX:	E. coli sepsis  Goal:	Resolve infection  Instructions for follow-up, activity and diet:	Complete ten more days of levofloxacin and follow up with Urology and Infectious disease  Secondary Diagnosis:	Enlarged prostate  Goal:	Normal urination  Instructions for follow-up, activity and diet:	Follow up with Urology  Secondary Diagnosis:	Fatty liver  Goal:	Weight loss  Instructions for follow-up, activity and diet:	Diet and exercise  Secondary Diagnosis:	Obstructive sleep apnea syndrome  Secondary Diagnosis:	Class 2 obesity due to excess calories with serious comorbidity in adult, unspecified BMI Principal Discharge DX:	E. coli sepsis  Goal:	Resolve infection  Instructions for follow-up, activity and diet:	Complete ten more days of levofloxacin and follow up with Urology and Infectious disease  Secondary Diagnosis:	Enlarged prostate  Goal:	Normal urination  Instructions for follow-up, activity and diet:	Follow up with Urology  Secondary Diagnosis:	Fatty liver  Goal:	Weight loss  Instructions for follow-up, activity and diet:	Diet and exercise  Secondary Diagnosis:	Obstructive sleep apnea syndrome  Goal:	Stable respiration  Instructions for follow-up, activity and diet:	Wear Bipap mask and follow up with [facility]  Secondary Diagnosis:	Class 2 obesity due to excess calories with serious comorbidity in adult, unspecified BMI  Goal:	Weight loss  Instructions for follow-up, activity and diet:	Diet and exercise

## 2017-10-10 NOTE — DISCHARGE NOTE ADULT - PATIENT PORTAL LINK FT
“You can access the Follow[facility] Patient Portal, offered by [facility], by registering with the following website: [email or web address]”

## 2017-10-10 NOTE — DISCHARGE NOTE ADULT - PLAN OF CARE
Resolve infection Complete ten more days of levofloxacin and follow up with Urology and Infectious disease Normal urination Follow up with Urology Weight loss Diet and exercise Stable respiration Wear Bipap mask and follow up with [facility]

## 2017-10-10 NOTE — DISCHARGE NOTE ADULT - MEDICATION SUMMARY - MEDICATIONS TO STOP TAKING
I will STOP taking the medications listed below when I get home from the hospital:    Bactrim  mg-160 mg oral tablet  -- 1 tab(s) by mouth 2 times a day  -- Avoid prolonged or excessive exposure to direct and/or artificial sunlight while taking this medication.  Finish all this medication unless otherwise directed by prescriber.  Medication should be taken with plenty of water.    phenazopyridine 200 mg oral tablet  -- 1 tab(s) by mouth 3 times a day  -- May discolor urine or feces.  Medication should be taken with plenty of water.  Take with food or milk.

## 2017-10-10 NOTE — PROGRESS NOTE ADULT - PROBLEM SELECTOR PLAN 1
Continue with abx as per medicine.
Positive blood cultures, on rocephin. Awaiting sensitivity.  Consult ID.  ID input appreciated, may switch to oral abx in am
Positive blood cultures, on rocephin. Awaiting sensitivity.  Consult ID.  ID input appreciated, may switch to oral abx in am.  patient prescribed a prolonged course of levofloxacin.
Positive blood cultures, on rocephin. Awaiting sensitivity.  Consult ID
- will de-escalate from Ertapenem to Levaquin daily  - repeat blood cultures today  - anticipate 14 days of therapy.  THROUGH 10/22/17

## 2017-10-10 NOTE — PROGRESS NOTE ADULT - ATTENDING COMMENTS
no objections to discharge from ID point of view;   please follow with me in office in 14 days.
DC home with outpatient follow up.
dc in am

## 2017-10-10 NOTE — PROGRESS NOTE ADULT - SUBJECTIVE AND OBJECTIVE BOX
GABE ALFARO     Chief Complaint: Patient is a 52y old  Male who presents with a chief complaint of Fever (07 Oct 2017 14:43)      PAST MEDICAL & SURGICAL HISTORY:  Fatty liver  Sleep apnea  Enlarged prostate  S/P appendectomy      HPI/OVERNIGHT EVENTS:    MEDICATIONS  (STANDING):  influenza   Vaccine 0.5 milliLiter(s) IntraMuscular once  levoFLOXacin IVPB 750 milliGRAM(s) IV Intermittent every 24 hours  saccharomyces boulardii 500 milliGRAM(s) Oral two times a day  tamsulosin 0.4 milliGRAM(s) Oral at bedtime      Vital Signs Last 24 Hrs  T(C): 38.4 (09 Oct 2017 17:29), Max: 38.9 (09 Oct 2017 10:52)  T(F): 101.1 (09 Oct 2017 17:29), Max: 102.1 (09 Oct 2017 10:52)  HR: 82 (09 Oct 2017 17:29) (81 - 92)  BP: 137/87 (09 Oct 2017 17:29) (121/67 - 137/87)  BP(mean): --  RR: 18 (09 Oct 2017 17:29) (18 - 20)  SpO2: 97% (09 Oct 2017 17:29) (94% - 97%)    PHYSICAL EXAM:  Constitutional: NAD, well-groomed, well-developed  HEENT: PERRLA, EOMI, Normal Hearing, MMM  Neck: No LAD, No JVD  Back: Normal spine flexure, No CVA tenderness  Respiratory: CTAB Cardiovascular: S1 and S2, RRR, no M/G/R  Gastrointestinal: BS+, soft, NT/ND  Extremities: No peripheral edema  Vascular: 2+ peripheral pulses  Neurological: A/O x 3, no focal deficits  Psychiatric: Normal mood, normal affect  Musculoskeletal: 5/5 strength b/l upper and lower extremities  Skin: No rashes    CAPILLARY BLOOD GLUCOSE    LABS:                        13.8   9.5   )-----------( 186      ( 09 Oct 2017 09:15 )             40.8     10-09    140  |  103  |  11.0  ----------------------------<  145<H>  3.8   |  24.0  |  0.76    Ca    8.9      09 Oct 2017 09:15  Phos  2.1     10-09  Mg     1.8     10-09            RADIOLOGY & ADDITIONAL TESTS:
GABE ALFARO     Chief Complaint: Patient is a 52y old  Male who presents with a chief complaint of Fever (07 Oct 2017 14:43)      PAST MEDICAL & SURGICAL HISTORY:  Fatty liver  Sleep apnea  Enlarged prostate  S/P appendectomy      HPI/OVERNIGHT EVENTS: Patient complains of sleep apnea and he has positive blood culture.    MEDICATIONS  (STANDING):  cefTRIAXone   IVPB 1 Gram(s) IV Intermittent every 24 hours  influenza   Vaccine 0.5 milliLiter(s) IntraMuscular once  potassium chloride    Tablet ER 40 milliEquivalent(s) Oral every 4 hours      Vital Signs Last 24 Hrs  T(C): 37 (08 Oct 2017 10:56), Max: 38.8 (07 Oct 2017 21:58)  T(F): 98.6 (08 Oct 2017 10:56), Max: 101.8 (07 Oct 2017 21:58)  HR: 83 (08 Oct 2017 10:56) (76 - 98)  BP: 140/82 (08 Oct 2017 10:56) (109/59 - 140/82)  BP(mean): --  RR: 18 (08 Oct 2017 10:56) (18 - 20)  SpO2: --    PHYSICAL EXAM:  Constitutional: NAD, well-groomed, well-developed  HEENT: PERRLA, EOMI, Normal Hearing, MMM  Neck: No LAD, No JVD  Back: Normal spine flexure, No CVA tenderness  Respiratory: CTAB Cardiovascular: S1 and S2, RRR, no M/G/R  Gastrointestinal: BS+, soft, NT/ND  Extremities: No peripheral edema  Vascular: 2+ peripheral pulses  Neurological: A/O x 3, no focal deficits  Psychiatric: Normal mood, normal affect  Musculoskeletal: 5/5 strength b/l upper and lower extremities  Skin: No rashes    CAPILLARY BLOOD GLUCOSE    LABS:                        13.6   21.8  )-----------( 187      ( 08 Oct 2017 06:57 )             40.2     10-08    142  |  106  |  11.0  ----------------------------<  122<H>  3.4<L>   |  22.0  |  0.70    Ca    8.0<L>      08 Oct 2017 06:57  Mg     1.6     10-08      PT/INR - ( 07 Oct 2017 12:06 )   PT: 12.3 sec;   INR: 1.12 ratio         PTT - ( 07 Oct 2017 12:06 )  PTT:28.0 sec  Urinalysis Basic - ( 07 Oct 2017 12:30 )    Color: Yellow / Appearance: Clear / S.015 / pH: x  Gluc: x / Ketone: Negative  / Bili: Negative / Urobili: Negative mg/dL   Blood: x / Protein: 15 mg/dL / Nitrite: Negative   Leuk Esterase: Moderate / RBC: 3-5 /HPF / WBC 6-10   Sq Epi: x / Non Sq Epi: Occasional / Bacteria: x        RADIOLOGY & ADDITIONAL TESTS:
GABE ALFARO     Chief Complaint: Patient is a 52y old  Male who presents with a chief complaint of Fever (07 Oct 2017 14:43)      PAST MEDICAL & SURGICAL HISTORY:  Fatty liver  Sleep apnea  Enlarged prostate  S/P appendectomy      HPI/OVERNIGHT EVENTS: Patient is doing well    MEDICATIONS  (STANDING):  influenza   Vaccine 0.5 milliLiter(s) IntraMuscular once  levoFLOXacin IVPB 750 milliGRAM(s) IV Intermittent every 24 hours  saccharomyces boulardii 500 milliGRAM(s) Oral two times a day  tamsulosin 0.4 milliGRAM(s) Oral at bedtime      Vital Signs Last 24 Hrs  T(C): 36.9 (10 Oct 2017 11:26), Max: 38.4 (09 Oct 2017 17:29)  T(F): 98.4 (10 Oct 2017 11:26), Max: 101.1 (09 Oct 2017 17:29)  HR: 101 (10 Oct 2017 11:26) (61 - 101)  BP: 111/76 (10 Oct 2017 11:26) (111/76 - 137/87)  BP(mean): --  RR: 18 (10 Oct 2017 11:26) (18 - 18)  SpO2: 98% (10 Oct 2017 04:28) (97% - 98%)    PHYSICAL EXAM:  Constitutional: NAD, well-groomed, well-developed  HEENT: PERRLA, EOMI, Normal Hearing, MMM  Neck: No LAD, No JVD  Back: Normal spine flexure, No CVA tenderness  Respiratory: CTAB Cardiovascular: S1 and S2, RRR, no M/G/R  Gastrointestinal: BS+, soft, NT/ND  Extremities: No peripheral edema  Vascular: 2+ peripheral pulses  Neurological: A/O x 3, no focal deficits  Psychiatric: Normal mood, normal affect  Musculoskeletal: 5/5 strength b/l upper and lower extremities  Skin: No rashes    CAPILLARY BLOOD GLUCOSE    LABS:                        13.8   9.5   )-----------( 186      ( 09 Oct 2017 09:15 )             40.8     10-10    140  |  103  |  13.0  ----------------------------<  132<H>  4.0   |  25.0  |  0.80    Ca    8.9      10 Oct 2017 08:06  Phos  2.1     10-09  Mg     1.8     10-09            RADIOLOGY & ADDITIONAL TESTS:
INTERVAL HPI/OVERNIGHT EVENTS: Patient is awaiting discharge.  He reports voiding better now that he is on flomax.    MEDICATIONS  (STANDING):  levoFLOXacin IVPB 750 milliGRAM(s) IV Intermittent every 24 hours  saccharomyces boulardii 500 milliGRAM(s) Oral two times a day  tamsulosin 0.4 milliGRAM(s) Oral at bedtime    MEDICATIONS  (PRN):  acetaminophen   Tablet 650 milliGRAM(s) Oral every 6 hours PRN For Temp greater than 38 C (100.4 F)      Allergies    No Known Allergies    Intolerances        Vital Signs Last 24 Hrs  T(C): 37.2 (10 Oct 2017 16:00), Max: 38.4 (09 Oct 2017 17:29)  T(F): 98.9 (10 Oct 2017 16:00), Max: 101.1 (09 Oct 2017 17:29)  HR: 78 (10 Oct 2017 16:00) (61 - 101)  BP: 117/71 (10 Oct 2017 16:00) (111/76 - 137/87)  BP(mean): --  RR: 20 (10 Oct 2017 16:00) (18 - 20)  SpO2: 98% (10 Oct 2017 04:28) (97% - 98%)    ROS:  as per HPI     ON PE:  General: Well developed; well nourished; in no acute distress  Head: Normocephalic; atraumatic  Respiratory: No tachypnea  Gastrointestinal: Soft non-tender non-distended;  Genitourinary: No costovertebral angle tenderness.  Urinary bladder is clinically not distended  Extremities: Normal range of motion, No edema  Neurological: Alert and oriented x4  Skin: Warm and dry. No acute rash  Musculoskeletal: Normal gait, tone, without deformities  Psychiatric: Cooperative and appropriate      LABS:                        13.8   9.5   )-----------( 186      ( 09 Oct 2017 09:15 )             40.8     10-10    140  |  103  |  13.0  ----------------------------<  132<H>  4.0   |  25.0  |  0.80    Ca    8.9      10 Oct 2017 08:06  Phos  2.1     10-09  Mg     1.8     10-09            RADIOLOGY & ADDITIONAL TESTS:
[facility]  INFECTIOUS DISEASES AND INTERNAL MEDICINE OF [place]  =======================================================  Jermain Cadet MD  Diplomates American Board of Internal Medicine and Infectious Diseases  =======================================================    GABE ALFARO [id number]  chief complaint: UTI and E coli bacteremia    patient seen and examined in follow up.  Chart and labs reviewed.   E coli susceptible to multiple drugs    Repeat blood cultures not done WBC is now half or prior. still with fever    =======================================================  Allergies:  No Known Allergies      =======================================================  Medications:  acetaminophen   Tablet 650 milliGRAM(s) Oral every 6 hours PRN  influenza   Vaccine 0.5 milliLiter(s) IntraMuscular once  levoFLOXacin IVPB 750 milliGRAM(s) IV Intermittent every 24 hours  potassium chloride    Tablet ER 40 milliEquivalent(s) Oral every 4 hours  saccharomyces boulardii 500 milliGRAM(s) Oral two times a day       =======================================================     REVIEW OF SYSTEMS:  CONSTITUTIONAL:  No Fever or chills  HEENT:   No diplopia or blurred vision.  No earache, sore throat or runny nose.  CARDIOVASCULAR:  No pressure, squeezing, strangling, tightness, heaviness or aching about the chest, neck, axilla or epigastrium.  RESPIRATORY:  No cough, shortness of breath, PND or orthopnea.  GASTROINTESTINAL:  No nausea, vomiting or diarrhea.  GENITOURINARY:  No dysuria, frequency or urgency. No Blood in urine  MUSCULOSKELETAL:  no joint aches, no muscle pain  SKIN:  No change in skin, hair or nails.  NEUROLOGIC:  No paresthesias, fasciculations, seizures or weakness.  PSYCHIATRIC:  No disorder of thought or mood.  ENDOCRINE:  No heat or cold intolerance, polyuria or polydipsia.  HEMATOLOGICAL:  No easy bruising or bleeding.     =======================================================     Physical Exam:  ICU Vital Signs Last 24 Hrs  T(C): 38.9 (09 Oct 2017 10:52), Max: 38.9 (09 Oct 2017 10:52)  T(F): 102.1 (09 Oct 2017 10:52), Max: 102.1 (09 Oct 2017 10:52)  HR: 81 (09 Oct 2017 10:52) (81 - 92)  BP: 128/77 (09 Oct 2017 10:52) (121/67 - 144/94)  RR: 18 (09 Oct 2017 10:52) (18 - 20)  SpO2: 94% (09 Oct 2017 10:52) (94% - 97%)    General: Alert and oriented, No acute distress. obese  Eye: Pupils are equal, round and reactive to light, Extraocular movements are intact, Normal conjunctiva.  HENT: Normocephalic, Oral mucosa is moist, No pharyngeal erythema, No sinus tenderness.  Neck: Supple, No lymphadenopathy.  Respiratory: Lungs are clear to auscultation, Respirations are non-labored.  Cardiovascular: Normal rate, Regular rhythm, No murmur, Good pulses equal in all extremities, No edema.  Gastrointestinal: Soft, Non-tender, Non-distended, Normal bowel sounds.  Genitourinary: No costovertebral angle tenderness.  TENDERNESS ON PALPATION OF SUPRAPUBIC REGION  Lymphatics: No lymphadenopathy neck, axilla, groin.  Musculoskeletal: Normal range of motion, Normal strength.  Integumentary: No rash.  Neurologic: Alert, Oriented, No focal deficits, Cranial Nerves II-XII are grossly intact.  Psychiatric: Appropriate mood & affect.    =======================================================    Labs:  10-09    140  |  103  |  11.0  ----------------------------<  145<H>  3.8   |  24.0  |  0.76    Ca    8.9      09 Oct 2017 09:15  Phos  2.1     10-09  Mg     1.8     10-09                            13.8   9.5   )-----------( 186      ( 09 Oct 2017 09:15 )             40.8       PT/INR - ( 07 Oct 2017 12:06 )   PT: 12.3 sec;   INR: 1.12 ratio         PTT - ( 07 Oct 2017 12:06 )  PTT:28.0 sec  Urinalysis Basic - ( 07 Oct 2017 12:30 )    Color: Yellow / Appearance: Clear / S.015 / pH: x  Gluc: x / Ketone: Negative  / Bili: Negative / Urobili: Negative mg/dL   Blood: x / Protein: 15 mg/dL / Nitrite: Negative   Leuk Esterase: Moderate / RBC: 3-5 /HPF / WBC 6-10   Sq Epi: x / Non Sq Epi: Occasional / Bacteria: x               RECENT CULTURES:  10-07 @ 12:40 .Blood Blood Escherichia coli    Growth in anaerobic bottle: Escherichia coli  Anaerobic Bottle: 12:13 Hours to positivity  Aerobic Bottle: No growth to date  .  TYPE: (C=Critical, N=Notification, A=Abnormal) C  TESTS:  _ Positive Blood GS  DATE/TIME CALLED: _ 10/08/2017 11:10  CALLED TO: _ 5TWR: Yo Rodriguez RN  READ BACK (2 Patient Identifiers)(Y/N): _ Y  READ BACK VALUES (Y/N): _ Y  CALLED BY: Rakan contreras        10-07 @ 12:30 .Urine Clean Catch (Midstream) Escherichia coli    >100,000 CFU/ml Escherichia coli        10-07 @ 12:08 .Blood Blood Escherichia coli  Blood Culture PCR    Growth in aerobic and anaerobic bottles: Escherichia coli  .  Anaerobic Bottle: 11:33 Hours to positivity  Aerobic Bottle: 14:44 Hours to positivity  .  TYPE: (C=Critical, N=Notification, A=Abnormal) C  TESTS:  _ Positive Blood GS  DATE/TIME CALLED: _ 10/08/2017 11:10  CALLED TO: _ 5TWR: Yo Rodriguez RN  READ BACK (2 Patient Identifiers)(Y/N): _ Y  READ BACK VALUES (Y/N): _ Y  CALLED BY: Rakan contreras

## 2017-10-10 NOTE — DISCHARGE NOTE ADULT - SECONDARY DIAGNOSIS.
Obstructive sleep apnea syndrome Class 2 obesity due to excess calories with serious comorbidity in adult, unspecified BMI Enlarged prostate Fatty liver

## 2017-10-10 NOTE — PROGRESS NOTE ADULT - ASSESSMENT
52 year old Male presented with dysuria. The patient had episodes of dysuria starting two days prior to admission. The patient also reports a sensation of loss of balance when he was ambulating. He denied any dizziness or loss of consciousness. The patient was noted to have a urinary tract infection last year requiring admission and treatment with antibiotics. He denied any fever or chills at home. The patient denied any discoloration or blood in the urine but did not a foul odor. He reports a history of an enlarged prostate but is not on any medications. He denied any hesitancy with urination but did report frequency and urgency. He also reported some lower back pain as well. 10/8 patient has positive blood cultures. ID consulted as well as pulm for bipap for sleep apnea.  10/10 Patient has been sen by ID and urology and is cleared for discharge with outpatient follow up.

## 2017-10-10 NOTE — PROGRESS NOTE ADULT - PROBLEM SELECTOR PROBLEM 2
Enlarged prostate
Acute cystitis without hematuria
Acute UTI

## 2017-10-10 NOTE — DISCHARGE NOTE ADULT - CARE PROVIDER_API CALL
Ender Shoemaker), Critical Care Medicine; Internal Medicine; Pulmonary Disease  Medicine [address] [address]    Nilton Garcia), Infectious Disease; Internal Medicine  [address] [place] [address]  Phone: [phone]  Fax: [phone]    Eddie Walden), Urology  [address] [place] [address]  Phone: [phone]  Fax: [phone]    Ángel Joel), Internal Medicine  [address] [place] [address]  Phone: [phone]  Fax: [phone]

## 2017-10-10 NOTE — PROGRESS NOTE ADULT - PROBLEM SELECTOR PLAN 3
outpatient Urology follow up
Pulm input appreciated, bipap ordered.

## 2017-10-10 NOTE — PROGRESS NOTE ADULT - PROBLEM SELECTOR PROBLEM 1
Acute UTI
Sepsis, due to unspecified organism
E. coli sepsis

## 2017-10-10 NOTE — PROGRESS NOTE ADULT - PROBLEM SELECTOR PROBLEM 3
Sleep apnea, unspecified type
Enlarged prostate

## 2017-10-10 NOTE — PROGRESS NOTE ADULT - PROBLEM SELECTOR PLAN 2
The patient reports improved voiding on flomax.  Continue with flomax.  Follow up with Dr. Walden as outpatient.
Continue rocephin, ID recommends levo, dc in am
Continue rocephin, ID recommends levo.
Continue rocephin
as above

## 2017-10-12 LAB
CULTURE RESULTS: SIGNIFICANT CHANGE UP
ORGANISM # SPEC MICROSCOPIC CNT: SIGNIFICANT CHANGE UP
ORGANISM # SPEC MICROSCOPIC CNT: SIGNIFICANT CHANGE UP
SPECIMEN SOURCE: SIGNIFICANT CHANGE UP

## 2017-10-14 LAB
CULTURE RESULTS: SIGNIFICANT CHANGE UP
CULTURE RESULTS: SIGNIFICANT CHANGE UP
SPECIMEN SOURCE: SIGNIFICANT CHANGE UP
SPECIMEN SOURCE: SIGNIFICANT CHANGE UP

## 2017-10-17 PROBLEM — Z00.00 ENCOUNTER FOR PREVENTIVE HEALTH EXAMINATION: Status: ACTIVE | Noted: 2017-10-17

## 2017-10-18 PROBLEM — K76.0 FATTY (CHANGE OF) LIVER, NOT ELSEWHERE CLASSIFIED: Chronic | Status: ACTIVE | Noted: 2017-10-07

## 2017-10-30 ENCOUNTER — APPOINTMENT (OUTPATIENT)
Dept: INTERNAL MEDICINE | Facility: CLINIC | Age: 52
End: 2017-10-30
Payer: MEDICAID

## 2017-10-30 PROCEDURE — 99213 OFFICE O/P EST LOW 20 MIN: CPT | Mod: 25

## 2017-10-30 PROCEDURE — 36415 COLL VENOUS BLD VENIPUNCTURE: CPT

## 2017-11-15 ENCOUNTER — RECORD ABSTRACTING (OUTPATIENT)
Age: 52
End: 2017-11-15

## 2017-11-15 ENCOUNTER — APPOINTMENT (OUTPATIENT)
Dept: PULMONOLOGY | Facility: CLINIC | Age: 52
End: 2017-11-15
Payer: MEDICAID

## 2017-11-15 VITALS
HEIGHT: 68.5 IN | OXYGEN SATURATION: 96 % | WEIGHT: 233 LBS | SYSTOLIC BLOOD PRESSURE: 120 MMHG | DIASTOLIC BLOOD PRESSURE: 78 MMHG | HEART RATE: 81 BPM | BODY MASS INDEX: 34.91 KG/M2

## 2017-11-15 DIAGNOSIS — A41.51 SEPSIS DUE TO ESCHERICHIA COLI [E. COLI]: ICD-10-CM

## 2017-11-15 DIAGNOSIS — Z87.898 PERSONAL HISTORY OF OTHER SPECIFIED CONDITIONS: ICD-10-CM

## 2017-11-15 DIAGNOSIS — N40.0 BENIGN PROSTATIC HYPERPLASIA WITHOUT LOWER URINARY TRACT SYMPMS: ICD-10-CM

## 2017-11-15 DIAGNOSIS — Z87.440 PERSONAL HISTORY OF URINARY (TRACT) INFECTIONS: ICD-10-CM

## 2017-11-15 DIAGNOSIS — K76.0 FATTY (CHANGE OF) LIVER, NOT ELSEWHERE CLASSIFIED: ICD-10-CM

## 2017-11-15 PROCEDURE — 99214 OFFICE O/P EST MOD 30 MIN: CPT

## 2017-11-15 RX ORDER — TAMSULOSIN HYDROCHLORIDE 0.4 MG/1
0.4 CAPSULE ORAL
Refills: 0 | Status: ACTIVE | COMMUNITY

## 2017-12-10 ENCOUNTER — OUTPATIENT (OUTPATIENT)
Dept: OUTPATIENT SERVICES | Facility: HOSPITAL | Age: 52
LOS: 1 days | End: 2017-12-10
Payer: MEDICAID

## 2017-12-10 DIAGNOSIS — Z90.49 ACQUIRED ABSENCE OF OTHER SPECIFIED PARTS OF DIGESTIVE TRACT: Chronic | ICD-10-CM

## 2017-12-10 DIAGNOSIS — G47.33 OBSTRUCTIVE SLEEP APNEA (ADULT) (PEDIATRIC): ICD-10-CM

## 2017-12-10 PROCEDURE — 95811 POLYSOM 6/>YRS CPAP 4/> PARM: CPT | Mod: 26

## 2017-12-10 PROCEDURE — 95811 POLYSOM 6/>YRS CPAP 4/> PARM: CPT

## 2018-05-21 ENCOUNTER — APPOINTMENT (OUTPATIENT)
Dept: PULMONOLOGY | Facility: CLINIC | Age: 53
End: 2018-05-21
Payer: MEDICAID

## 2018-05-21 VITALS
OXYGEN SATURATION: 97 % | DIASTOLIC BLOOD PRESSURE: 74 MMHG | HEART RATE: 78 BPM | SYSTOLIC BLOOD PRESSURE: 102 MMHG | BODY MASS INDEX: 35.51 KG/M2 | WEIGHT: 237 LBS

## 2018-05-21 DIAGNOSIS — G47.33 OBSTRUCTIVE SLEEP APNEA (ADULT) (PEDIATRIC): ICD-10-CM

## 2018-05-21 DIAGNOSIS — E66.9 OBESITY, UNSPECIFIED: ICD-10-CM

## 2018-05-21 PROCEDURE — 99214 OFFICE O/P EST MOD 30 MIN: CPT

## 2018-11-05 NOTE — PATIENT PROFILE ADULT. - NS PRO PT RIGHT SUPPORT PERSON
Continue: Besivance (besifloxacin): drops,suspension: 0.6% 1 drop three times a day as directed into right eye 10- Yes

## 2019-04-18 NOTE — PATIENT PROFILE ADULT. - LIMITATIONS ON VISITORS/PHONE CALLS
I discussed the patient with the resident, reviewed their findings, assessment, and plan, and agree with the content of their clinical note for today.  Acute bilateral low back pain with bilateral sciatica  - ibuprofen (MOTRIN) 800 MG tablet; Take 1 tablet by mouth 3 times daily.  Dispense: 90 tablet; Refill: 0  - DIAZepam (VALIUM) 10 MG tablet; Take 1 tablet by mouth every 8 hours as needed for Anxiety or Muscle spasms.  Dispense: 20 tablet; Refill: 0  - HYDROcodone-acetaminophen (NORCO) 5-325 MG per tablet; Take 1-2 tablets by mouth every 4 hours as needed for Pain.  Dispense: 40 tablet; Refill: 0    Back pain with little response to meds, MRI negative for radiculopathy, needs PT.   none

## 2020-12-20 ENCOUNTER — TRANSCRIPTION ENCOUNTER (OUTPATIENT)
Age: 55
End: 2020-12-20

## 2021-04-16 NOTE — ED PROVIDER NOTE - FAMILY HISTORY
[As Noted in HPI] : as noted in HPI [Negative] : Heme/Lymph No pertinent family history in first degree relatives

## 2022-08-12 ENCOUNTER — NON-APPOINTMENT (OUTPATIENT)
Age: 57
End: 2022-08-12

## 2022-11-11 ENCOUNTER — NON-APPOINTMENT (OUTPATIENT)
Age: 57
End: 2022-11-11

## 2023-02-17 NOTE — ED ADULT NURSE NOTE - DURATION
Detail Level: Detailed Quality 431: Preventive Care And Screening: Unhealthy Alcohol Use - Screening: Patient not identified as an unhealthy alcohol user when screened for unhealthy alcohol use using a systematic screening method Quality 226: Preventive Care And Screening: Tobacco Use: Screening And Cessation Intervention: Patient screened for tobacco use and is an ex/non-smoker day(s)

## 2023-12-26 ENCOUNTER — NON-APPOINTMENT (OUTPATIENT)
Age: 58
End: 2023-12-26

## 2024-04-24 NOTE — CONSULT NOTE ADULT - CONSULT REASON
BRIDGETTE
Dysuria and back pain
UTI, bacteremia with E. coli
[FreeTextEntry1] : 74 yo M with PMH of HTN, HLD, CAD s/p 4 stents, glaucoma, CKD, here for Type 2 Diabetes follow up.   Initial visit in 2024 w/ NP Carlyn Sánchez; Referred by PCP Dr. Jackson.   2024:  Accompanied by his aid today.  No glucose data/cgm/glucometer/logs with him today. Does not like CGM he says. Alarms too much and not accurate when he confirms with FS. Prefers to use FS. Reports BG levels better controlled recently. Sometimes having lows. Eating better nowadays.   Diagnosis: DM about 28 years ago, (has been on insulin for many years) Previous Endo: none, managed by PCP A1c in office today 6.5% A1c: 7.8% (2023) <-- 8.2% (2023) <-- 8.0% (2023). Pertinent labs: TSH 0.32 (2023). Vitamin B12 1108 (2022).   Current DM Medications: -Jardiance 25 mg daily (started mid, tolerating well, no urinary symptoms) -Basaglar variable doses at bedtime - last couple of nights, 14-20-28 units qhs  *reports he is adjusting basal dose based off of bedtime/evening sugars- if running low, will reduce basal dose.  **patient states last night he took 14 units at bedtime and AM glucose was 124.  -Novolog 10 units TIDAC (before each meal, waits 5-10 minutes before start of meal) ***always takes 10 units he says  -Trulicity 0.75mg sc weekly (started 2024; takes every monday)   Past DM medications:  -metformin 500 mg BID (tolerated well, stopped mid  due to lowering kidney function, switched to Jardiance after) -Janumet (tolerated well, stopped and switched to Jardiance) -glipizide 5 mg qam (started mid) Discontinued in 2024   Complications: +CAD s/p 4 stents (). Denies CVA. Denies h/o DKA or other hospitalizations for DM.  Eyes: Last ophthalmology visit early , has an appt coming up at the end of 2024 pt says. Denies h/o of diabetic retinopathy. +Legally blind, R eye only has 25% vision, L eye has 0% vision, attributed to glaucoma. Rpeorts UTD w/ optho. Follows closely.  Feet: Has not seen podiatry before. Self-checks feet. Denies h/o neuropathy. Denies h/o foot ulcers or sores. MFE 2024.   Nephrology: +CKD. eGFR 51 (2023). UACR negative (2022). Denies h/o UTIs or genital mycotic infections.  Denies polyuria or polydipsia. Sometimes gets thirsty when BP or BG is low but not recently.   Weight Change: 175lbs, has lost a couple of pounds - 177 at last visit.   SMBG (Contour Next): checks 2-4x/day *No glucose data available for review today.  Patient recall as per below:  fasting- 124-67-99 **feels like he is having lows in the morning, sometimes has a banana before bedtime and feels this helps him wake up without a low sugar in the morning.  pre-lunch- 120-140-130 pre-dinner -120 Hypoglycemia: Reports 67 in AM recently, having lows sometimes in the morning if he doesn't have bedtime snack he says - has felt shaky and sweaty before with low sugars.   Diet: 3 meals a day, good appetite 7-7:30am breakfast- oatmeal, 2 slices ww toast, eggs sometimes with toast (not regularly), banana sometimes, coffee or tea no sugar with a little 2% milk    12pm lunch- depends, 1 fistful of rice or ww roti or pasta, chicken/fish, veggies 5pm dinner- similar to lunch (lighter than lunch), salad, grilled chicken/fish, steamed veggies snacks- on occasion, not always, sugar free cookies, usually sugar free snacks,  drinks- mostly water, no soda or juice  Exercise: walking 90 minutes daily when weather is good, sometimes stationary bike   HLD: On atorvastatin 20 mg qd, fenofibrate 145 mg qd. LDL 72, HDL 31,  (2023). HTN: isosorbide mononitrate ER 30 mg qd, losartan 25 mg qd, metoprolol ER 25 mg qd. reports no longer on furosemide.    PMH: T2DM, HTN, HLD, CAD s/p 4 stents, glaucoma Surgical Hx: none Social Hx: never a smoker, social alcohol use  FHx: daughter (DM), son (DM), dad (CVA), mom ( from MVA), denies personal or FHx of pancreatitis or thyroid cancer Current Meds: DM meds as above, gabapentin 600 mg BID, atorvastatin 20 mg qd, fenofibrate 145 mg qd, clopidogrel 75 mg qd, furosemide 20 mg qod, isosorbide mononitrate ER 30 mg qd, losartan 25 mg qd, metoprolol ER 25 mg qd, tamsulosin 0.4 mg qd, dorzolamide ophthalmic solution, latanoprost ophthalmic solution Supplements: MVI, fish oil, vitamin C, vitamin D 1000 IU qd, cod liver oil Occupation: retired

## 2024-06-10 ENCOUNTER — APPOINTMENT (OUTPATIENT)
Dept: PULMONOLOGY | Facility: CLINIC | Age: 59
End: 2024-06-10
Payer: MEDICAID

## 2024-06-10 ENCOUNTER — NON-APPOINTMENT (OUTPATIENT)
Age: 59
End: 2024-06-10

## 2024-06-10 VITALS — HEART RATE: 79 BPM | OXYGEN SATURATION: 97 % | RESPIRATION RATE: 16 BRPM

## 2024-06-10 VITALS — HEIGHT: 68.5 IN | WEIGHT: 248 LBS | BODY MASS INDEX: 37.15 KG/M2

## 2024-06-10 VITALS — DIASTOLIC BLOOD PRESSURE: 80 MMHG | SYSTOLIC BLOOD PRESSURE: 130 MMHG

## 2024-06-10 DIAGNOSIS — E66.9 OBESITY, UNSPECIFIED: ICD-10-CM

## 2024-06-10 DIAGNOSIS — Z87.438 PERSONAL HISTORY OF OTHER DISEASES OF MALE GENITAL ORGANS: ICD-10-CM

## 2024-06-10 DIAGNOSIS — G47.33 OBSTRUCTIVE SLEEP APNEA (ADULT) (PEDIATRIC): ICD-10-CM

## 2024-06-10 PROCEDURE — 99204 OFFICE O/P NEW MOD 45 MIN: CPT

## 2024-06-10 PROCEDURE — G2211 COMPLEX E/M VISIT ADD ON: CPT | Mod: NC

## 2024-06-10 NOTE — HISTORY OF PRESENT ILLNESS
[Obstructive Sleep Apnea] : obstructive sleep apnea [AHI: ___ per hour] : Apnea-hypopnea index:  [unfilled] per hour [Date: ___] : the most recent therapeutic polysomnogram was completed [unfilled] [BPAP w/IPAP: ____ cmH2O] : BPAP with IPAP: [unfilled] cmH2O [BPAP w/EPAP: ___ cmH2O] : BPAP with EPAP: [unfilled] cmH2O [FreeTextEntry1] : Hx severe BRIDGETTE on bpap.  Last here in 2018 b/c lost insurance.  Using BPAP since 2012 at 19/15. Latest titration study in 12/2017 showed no change. Received new equipment in 2017 and was doing well.  No EDS. Feels well. Will not sleep w/o it. Uses it every night. Machine not working well. Heater not working.   Unable to lose weight.

## 2024-06-10 NOTE — PLAN
[TextEntry] : Told will need new sleep study.  The will order new bpap. He says he is using Simplus FFM medium. Reviewed compliance reviewed. Weight loss.

## 2024-06-10 NOTE — CONSULT LETTER
[Dear  ___] : Dear  [unfilled], [Courtesy Letter:] : I had the pleasure of seeing your patient, [unfilled], in my office today. [Consult Closing:] : Thank you very much for allowing me to participate in the care of this patient.  If you have any questions, please do not hesitate to contact me. [Yuri Fonseca MD] : Yuri Fonseca MD

## 2024-06-10 NOTE — ADDENDUM
[FreeTextEntry1] : Compliance received and reviewed. Excellent compliance on 19/15. Therapeutic AHI 1.2  ACTING OUT BEHAVIORS

## 2024-06-10 NOTE — REVIEW OF SYSTEMS
[Snoring] : snoring [Obesity] : obesity [Negative] : Psychiatric [EDS: ESS=____] : no daytime somnolence [Difficulty Initiating Sleep] : no difficulty falling asleep [Difficulty Maintaining Sleep] : no difficulty maintaining sleep [Lower Extremity Discomfort] : no lower extremity discomfort [Unusual Sleep Behavior] : no unusual sleep behavior [Hypersomnolence] : not sleeping much more than usual [FreeTextEntry5] : s/p uti

## 2024-06-10 NOTE — PHYSICAL EXAM
[General Appearance - In No Acute Distress] : no acute distress [Normal Conjunctiva] : the conjunctiva exhibited no abnormalities [Low Lying Soft Palate] : low lying soft palate [Elongated Uvula] : elongated uvula [Enlarged Base of the Tongue] : enlargement of the base of the tongue [IV] : IV [Heart Rate And Rhythm] : heart rate was normal and rhythm regular [Heart Sounds] : normal S1 and S2 [Respiration, Rhythm And Depth] : normal respiratory rhythm and effort [Auscultation Breath Sounds / Voice Sounds] : lungs were clear to auscultation bilaterally [Exaggerated Use Of Accessory Muscles For Inspiration] : no accessory muscle use [Abnormal Walk] : normal gait [Nail Clubbing] : no clubbing of the fingernails [Cyanosis, Localized] : no localized cyanosis [Skin Color & Pigmentation] : normal skin color and pigmentation [Cranial Nerves] : cranial nerves 2-12 were intact [No Focal Deficits] : no focal deficits [Oriented To Time, Place, And Person] : oriented to person, place, and time [Impaired Insight] : insight and judgment were intact [Affect] : the affect was normal [Neck Appearance] : the appearance of the neck was normal [] : the neck was supple [Neck Circumference: ___] : neck circumference is [unfilled] [Bowel Sounds] : normal bowel sounds [Abdomen Soft] : soft [Normal Oropharynx] : abnormal oropharynx [FreeTextEntry1] : obese

## 2024-06-12 ENCOUNTER — APPOINTMENT (OUTPATIENT)
Dept: PULMONOLOGY | Facility: CLINIC | Age: 59
End: 2024-06-12

## 2024-07-06 ENCOUNTER — OUTPATIENT (OUTPATIENT)
Dept: OUTPATIENT SERVICES | Facility: HOSPITAL | Age: 59
LOS: 1 days | End: 2024-07-06
Payer: COMMERCIAL

## 2024-07-06 DIAGNOSIS — G47.33 OBSTRUCTIVE SLEEP APNEA (ADULT) (PEDIATRIC): ICD-10-CM

## 2024-07-06 DIAGNOSIS — Z90.49 ACQUIRED ABSENCE OF OTHER SPECIFIED PARTS OF DIGESTIVE TRACT: Chronic | ICD-10-CM

## 2024-07-06 PROCEDURE — 95811 POLYSOM 6/>YRS CPAP 4/> PARM: CPT

## 2024-07-06 PROCEDURE — 95811 POLYSOM 6/>YRS CPAP 4/> PARM: CPT | Mod: 26

## 2024-08-22 ENCOUNTER — NON-APPOINTMENT (OUTPATIENT)
Age: 59
End: 2024-08-22

## 2024-08-22 DIAGNOSIS — M72.2 PLANTAR FASCIAL FIBROMATOSIS: ICD-10-CM

## 2024-08-22 DIAGNOSIS — M79.2 NEURALGIA AND NEURITIS, UNSPECIFIED: ICD-10-CM

## 2024-08-22 DIAGNOSIS — M21.6X2 OTHER ACQUIRED DEFORMITIES OF LEFT FOOT: ICD-10-CM

## 2024-08-22 DIAGNOSIS — M79.672 PAIN IN LEFT FOOT: ICD-10-CM

## 2024-08-22 RX ORDER — CELECOXIB 200 MG/1
200 CAPSULE ORAL
Refills: 0 | Status: ACTIVE | COMMUNITY

## 2024-09-23 ENCOUNTER — APPOINTMENT (OUTPATIENT)
Age: 59
End: 2024-09-23
Payer: MEDICAID

## 2024-09-23 DIAGNOSIS — M21.6X2 OTHER ACQUIRED DEFORMITIES OF LEFT FOOT: ICD-10-CM

## 2024-09-23 DIAGNOSIS — M79.2 NEURALGIA AND NEURITIS, UNSPECIFIED: ICD-10-CM

## 2024-09-23 DIAGNOSIS — M72.2 PLANTAR FASCIAL FIBROMATOSIS: ICD-10-CM

## 2024-09-23 DIAGNOSIS — M79.672 PAIN IN LEFT FOOT: ICD-10-CM

## 2024-09-23 DIAGNOSIS — Z86.69 PERSONAL HISTORY OF OTHER DISEASES OF THE NERVOUS SYSTEM AND SENSE ORGANS: ICD-10-CM

## 2024-09-23 PROCEDURE — 99203 OFFICE O/P NEW LOW 30 MIN: CPT

## 2024-09-23 NOTE — HISTORY OF PRESENT ILLNESS
[FreeTextEntry1] : Patient states he has pain about 3 days out of the week. Patient continues to take celecoxib when needed.  States pain is worse at night and depending on activity of the day.  Works in [employer].  stands for most of the day, and when he rests and gets back up he feels pain again.   Pain level 3 out of 10. Patient states he experienced the most pain last Spring due to increased work load, however now much improved.  Of note, patient also notes that he was seen by urologist in [facility] for his prostate, not a neurologist

## 2024-09-23 NOTE — ASSESSMENT
[FreeTextEntry1] : Discussed diagnosis and treatment with patient Discussed etiology of symptoms patient is experiencing  Continue proper stretching techniques with patient showing understanding Discussed proper RICE techniques to reduce inflammation and for pain control Discussed specific examples of over-the-counter inserts to control heel eversion and support the medial arch Discussed proper shoes (stiff heel counter and midsole with flexion at the 1st MTPJ) Take Celecoxib PO as needed Discussed all risks, complications and benefits of local injection therapy.  None indicated for now  Referred to [facility] neurology for consultation for Left common peroneal neuritis (post-COVID)  Patient to return to the office as needed for worsening/frequent symptoms

## 2024-09-23 NOTE — PHYSICAL EXAM
[General Appearance - Alert] : alert [General Appearance - In No Acute Distress] : in no acute distress [Ankle Swelling (On Exam)] : not present [Delayed in the Right Toes] : capillary refills normal in right toes [Delayed in the Left Toes] : capillary refills normal in the left toes [de-identified] : Minimal pain on palpation at medial calcaneal tuberosity of Left.  Increased pain with the windless mechanism.  Flexible mid/hindfoot deformity noted foot passively corrected to plantigrade foot position Right Left.  Decreased range of motion in dorsiflexion Right Left.  Muscle strength 5/5 all major muscle groups bilateral.    [Skin Color & Pigmentation] : normal skin color and pigmentation [Skin Turgor] : normal skin turgor [] : no rash [Skin Lesions] : no skin lesions [Foot Ulcer] : no foot ulcer [Skin Induration] : no skin induration [Sensation] : the sensory exam was normal to light touch and pinprick [No Focal Deficits] : no focal deficits [Deep Tendon Reflexes (DTR)] : deep tendon reflexes were 2+ and symmetric [Motor Exam] : the motor exam was normal [Oriented To Time, Place, And Person] : oriented to person, place, and time [Impaired Insight] : insight and judgment were intact [Affect] : the affect was normal

## 2024-10-11 ENCOUNTER — RX RENEWAL (OUTPATIENT)
Age: 59
End: 2024-10-11

## 2024-10-29 NOTE — DISCHARGE NOTE ADULT - CAREGIVER NAME
DME order created, patient notified via [employer],   see below    CPAP and BiLevel Resupply Package (Without F2F Documentation)    CPAP and BiLevel Resupply Package, Full Face - RESUPPLY    PAP Machine Tubing, Heated, 1 per 3 months -     Disposable PAP Filter, 2 per 1 month -     Non-Disposable PAP Filter, 1 per 6 months -     Humidifier Water Chamber, 1 per 6 months -     PAP Headgear, 1 per 6 months -     Quantity  1  Supplier  [facility]      This order was created from below order, per managerMilla   daughter Olivia